# Patient Record
(demographics unavailable — no encounter records)

---

## 2024-11-14 NOTE — REASON FOR VISIT
[Post Hospitalization] : a post hospitalization visit [Spouse] : spouse [FreeTextEntry1] : Duodenal ulcer

## 2024-11-14 NOTE — ASSESSMENT
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

## 2024-11-14 NOTE — HISTORY OF PRESENT ILLNESS
[FreeTextEntry1] : 79-year-old male Hospitalization May 2024 Duodenal ulcer with bleed anemia, H. pylori negative questionable intestinal metaplasia Reviewed medication use prior to admission, wife describes use of Aleve and Motrin for back pain now resolved Patient denies any black or bloody bowel movements  Of note, discharge paperwork states that his admitting diagnosis with cholangitis

## 2025-02-26 NOTE — REVIEW OF SYSTEMS
[Fever] : no fever [Recent Wt Loss (___ Lbs)] : ~T no recent weight loss [Nasal Congestion] : no nasal congestion [Cough] : no cough [Sputum] : no sputum [Dyspnea] : no dyspnea [SOB on Exertion] : no sob on exertion

## 2025-02-26 NOTE — HISTORY OF PRESENT ILLNESS
[Former] : former [>= 20 pack years] : >= 20 pack years [TextBox_4] : 79 year old male former smoker (50 pack year history, quit over 20 years prior) with hypertension, hyperlipidemia, diabetes here for incidentally found pulmonary nodule on CT chest.  Patient states that he had routine CT chest ordered by his PCP.  He denies any pulmonary symptoms.  Denies shortness of breath, dyspnea on exertion, cough, sputum production, weight loss, hemoptysis.  CT chest (2/4/2025) @ [facility]. 6 mm RUL ground glass opacity. [TextBox_11] : 1 [TextBox_13] : 50 [YearQuit] : 2005

## 2025-02-26 NOTE — ASSESSMENT
[FreeTextEntry1] : 79-year-old male former smoker (50 pack year history, quit over 20 years prior) with hypertension, hyperlipidemia,  diabetes here for incidentally found pulmonary nodule on CT chest. Patient is without symptoms and image reviewed showing 6mm RUL ground glass nodule. Discussed differential including inflammatory, infection, and malignancy. Repeat CT chest 6 months.  Follow up in 6 months, sooner if needed.